# Patient Record
Sex: FEMALE | Race: BLACK OR AFRICAN AMERICAN | URBAN - METROPOLITAN AREA
[De-identification: names, ages, dates, MRNs, and addresses within clinical notes are randomized per-mention and may not be internally consistent; named-entity substitution may affect disease eponyms.]

---

## 2020-07-24 ENCOUNTER — OFFICE VISIT (OUTPATIENT)
Dept: OTOLARYNGOLOGY | Facility: CLINIC | Age: 50
End: 2020-07-24
Payer: COMMERCIAL

## 2020-07-24 ENCOUNTER — TELEPHONE (OUTPATIENT)
Dept: OTOLARYNGOLOGY | Facility: CLINIC | Age: 50
End: 2020-07-24

## 2020-07-24 VITALS
WEIGHT: 221 LBS | DIASTOLIC BLOOD PRESSURE: 82 MMHG | SYSTOLIC BLOOD PRESSURE: 122 MMHG | BODY MASS INDEX: 36.82 KG/M2 | HEIGHT: 65 IN

## 2020-07-24 DIAGNOSIS — E21.3 HYPERPARATHYROIDISM (HCC): ICD-10-CM

## 2020-07-24 DIAGNOSIS — D35.1 PARATHYROID ADENOMA: Primary | ICD-10-CM

## 2020-07-24 PROCEDURE — 99242 OFF/OP CONSLTJ NEW/EST SF 20: CPT | Performed by: OTOLARYNGOLOGY

## 2020-07-24 PROCEDURE — 3074F SYST BP LT 130 MM HG: CPT | Performed by: OTOLARYNGOLOGY

## 2020-07-24 PROCEDURE — 3079F DIAST BP 80-89 MM HG: CPT | Performed by: OTOLARYNGOLOGY

## 2020-07-24 PROCEDURE — 3008F BODY MASS INDEX DOCD: CPT | Performed by: OTOLARYNGOLOGY

## 2020-07-24 RX ORDER — IBUPROFEN 800 MG/1
TABLET ORAL
COMMUNITY

## 2020-07-24 RX ORDER — CHLORTHALIDONE 25 MG/1
25 TABLET ORAL DAILY
COMMUNITY

## 2020-07-24 RX ORDER — DOXYCYCLINE HYCLATE 100 MG/1
CAPSULE ORAL
COMMUNITY

## 2020-07-24 RX ORDER — VALACYCLOVIR HYDROCHLORIDE 500 MG/1
TABLET, FILM COATED ORAL
COMMUNITY

## 2020-07-24 NOTE — TELEPHONE ENCOUNTER
[address] [place] [address], [phone], to obtain a copy of the pt's most recent blood work, per  pt does not have any blood work on file.  Called pt to verify if any recent blood work has been done, per pt she is to have blood work done today, advi

## 2020-07-27 NOTE — TELEPHONE ENCOUNTER
Pt calling to check status of results of labs states was faxed over by reception rep at [address] please advise

## 2020-07-27 NOTE — TELEPHONE ENCOUNTER
Advised pt that no results received, pt states will locate lab work and bring [address] results to [facility] on Friday

## 2020-07-30 ENCOUNTER — TELEPHONE (OUTPATIENT)
Dept: OTOLARYNGOLOGY | Facility: CLINIC | Age: 50
End: 2020-07-30

## 2020-08-03 ENCOUNTER — TELEPHONE (OUTPATIENT)
Dept: OTOLARYNGOLOGY | Facility: CLINIC | Age: 50
End: 2020-08-03

## 2020-08-04 NOTE — TELEPHONE ENCOUNTER
Pt calling again, made aware that results were placed on Dr. Alesha Ralph desk for review, asking for call back today. Please call thank you.

## 2020-08-04 NOTE — TELEPHONE ENCOUNTER
InformeD patient per Dr Radha Hughes PTH level were abnormal and adviseD pt need to have CMP test as Dr Radha Hughes need to know pt Calcium level and need to schedule surgery,pt verbalized understanding.

## 2020-08-06 NOTE — TELEPHONE ENCOUNTER
Sixto green will be seeing  as he is within her network and she has known him for over 20 years. Patient would like to say thank you for everything .

## 2020-08-06 NOTE — TELEPHONE ENCOUNTER
Patient states she is going to have the surgery with Dr. Maddison Mireles, but wants to speak to rn.  Please call

## 2020-08-06 NOTE — TELEPHONE ENCOUNTER
Pt states she found another physician to perform the surgery and wanted to inform MD of the change.  States because of insurance purposes please advise

## 2020-08-11 ENCOUNTER — OFFICE VISIT (OUTPATIENT)
Dept: SURGERY | Facility: CLINIC | Age: 50
End: 2020-08-11
Payer: COMMERCIAL

## 2020-08-11 VITALS — HEIGHT: 65 IN | WEIGHT: 221 LBS | BODY MASS INDEX: 36.82 KG/M2

## 2020-08-11 DIAGNOSIS — E21.3 HYPERPARATHYROIDISM (HCC): Primary | ICD-10-CM

## 2020-08-11 PROCEDURE — 99204 OFFICE O/P NEW MOD 45 MIN: CPT | Performed by: SURGERY

## 2020-08-11 PROCEDURE — 3008F BODY MASS INDEX DOCD: CPT | Performed by: SURGERY

## 2020-08-11 NOTE — H&P
History and Physical      HPI   Patient presents with:  Referral: Referral from Dr. Dr. Anne Poster  for abnormal thyroid. Patient denies any pain.         HPI  Marcos Ness is a 52year old female who presents with primary hyperparathyroidism secondary to a lesions are noted  Neck/Thyroid: neck is supple without adenopathy, no palpable masses.   Respiratory: normal to inspection lungs are clear to auscultation bilaterally normal respiratory effort  Cardiovascular: regular rate and rhythm no murmurs, gallups, o

## 2020-08-11 NOTE — PATIENT INSTRUCTIONS
Obtain preoperative testing. Plan parathyroidectomy. Same-day surgery will call with instructions. Applied

## 2020-08-12 ENCOUNTER — TELEPHONE (OUTPATIENT)
Dept: SURGERY | Facility: CLINIC | Age: 50
End: 2020-08-12

## 2020-08-13 ENCOUNTER — TELEPHONE (OUTPATIENT)
Dept: SURGERY | Facility: CLINIC | Age: 50
End: 2020-08-13

## 2020-08-13 NOTE — TELEPHONE ENCOUNTER
Per Neela Guy the parathyroidectomy (49818) is does not qualify for a 1 night inpatient stay. If there is more information that can be provided due constitute inpatient requirement please fax.  No comorbidity on request. Neela Guy is asking if an outpatient proce

## 2020-08-13 NOTE — TELEPHONE ENCOUNTER
Per Joan Mchugh, case # [id number] pending. Clinical submitted and faxed to [phone] on 8/13/2020.  Called and left message at [facility], [place], operated by [facility] @ [phone]

## 2020-08-13 NOTE — TELEPHONE ENCOUNTER
Approved [phone]. Approval is for outpatient, however, should she need a longer stay, then clinicals will need to be faxed. Detailed message left on VM at [facility] surgery scheduling with a call back number if they need further information.

## 2020-08-17 ENCOUNTER — TELEPHONE (OUTPATIENT)
Dept: SURGERY | Facility: CLINIC | Age: 50
End: 2020-08-17

## 2020-08-19 ENCOUNTER — OFFICE VISIT (OUTPATIENT)
Dept: SURGERY | Facility: CLINIC | Age: 50
End: 2020-08-19
Payer: COMMERCIAL

## 2020-08-19 VITALS — BODY MASS INDEX: 36.82 KG/M2 | WEIGHT: 221 LBS | HEIGHT: 65 IN

## 2020-08-19 DIAGNOSIS — E89.2 STATUS POST PARATHYROIDECTOMY (HCC): Primary | ICD-10-CM

## 2020-08-19 PROCEDURE — 3008F BODY MASS INDEX DOCD: CPT | Performed by: SURGERY

## 2020-08-19 PROCEDURE — 99024 POSTOP FOLLOW-UP VISIT: CPT | Performed by: SURGERY

## 2020-08-19 NOTE — PROGRESS NOTES
Postoperative Patient Follow-up      8/19/2020    HPI  Patient presents with:  Post-Op: Patient here for follow up Parathyroidectomy on 8-. Patient reports recovery going well. Incision healing well.   Patient reports eating and drinking normal.  P

## 2020-08-19 NOTE — PATIENT INSTRUCTIONS
Obtain BMP today. Follow-up with endocrinology in 2 weeks.   Continue taking Tums 2 pills every 8 hours until notified to stop

## 2020-08-20 ENCOUNTER — LAB ENCOUNTER (OUTPATIENT)
Dept: LAB | Age: 50
End: 2020-08-20
Attending: SURGERY
Payer: COMMERCIAL

## 2020-08-20 DIAGNOSIS — E89.2 STATUS POST PARATHYROIDECTOMY (HCC): ICD-10-CM

## 2020-08-20 LAB
ANION GAP SERPL CALC-SCNC: 5 MMOL/L (ref 0–18)
BUN BLD-MCNC: 9 MG/DL (ref 7–18)
BUN/CREAT SERPL: 13.2 (ref 10–20)
CALCIUM BLD-MCNC: 9.3 MG/DL (ref 8.5–10.1)
CHLORIDE SERPL-SCNC: 108 MMOL/L (ref 98–112)
CO2 SERPL-SCNC: 29 MMOL/L (ref 21–32)
CREAT BLD-MCNC: 0.68 MG/DL (ref 0.55–1.02)
GLUCOSE BLD-MCNC: 95 MG/DL (ref 70–99)
OSMOLALITY SERPL CALC.SUM OF ELEC: 292 MOSM/KG (ref 275–295)
PATIENT FASTING Y/N/NP: YES
POTASSIUM SERPL-SCNC: 3.3 MMOL/L (ref 3.5–5.1)
SODIUM SERPL-SCNC: 142 MMOL/L (ref 136–145)

## 2020-08-20 PROCEDURE — 80048 BASIC METABOLIC PNL TOTAL CA: CPT

## 2020-08-20 PROCEDURE — 36415 COLL VENOUS BLD VENIPUNCTURE: CPT

## 2020-09-15 ENCOUNTER — OFFICE VISIT (OUTPATIENT)
Dept: SURGERY | Facility: CLINIC | Age: 50
End: 2020-09-15
Payer: COMMERCIAL

## 2020-09-15 DIAGNOSIS — Z98.890 POST-OPERATIVE STATE: Primary | ICD-10-CM

## 2020-09-15 PROCEDURE — 99024 POSTOP FOLLOW-UP VISIT: CPT | Performed by: SURGERY

## 2020-09-15 NOTE — PROGRESS NOTES
General surgery progress note    Patient is here for a return to work slip with a specific date. She will return tomorrow Wednesday, September 16. She is status post parathyroidectomy doing well without problems.   She will follow-up with her endocrinolog

## 2020-12-21 ENCOUNTER — TELEPHONE (OUTPATIENT)
Dept: OTOLARYNGOLOGY | Facility: CLINIC | Age: 50
End: 2020-12-21

## 2020-12-21 NOTE — TELEPHONE ENCOUNTER
Dr Melchor Sow  patient stated she is seeing Dr Kelsie Hopper for her hyperparathyroidism ,since after the surgery in August of 2020 at [facility] advise.

## 2020-12-21 NOTE — TELEPHONE ENCOUNTER
Cherylene Moulder, MD  [facility]             Patient with hyperparathyroidism. Please contact patient to set up an appointment to be seen in the office. CHeck to see if she has had an ultrasound of the thyroid or Sestamibi nuclear scan.  If yes p

## (undated) NOTE — LETTER
To Whom It May Concern:    Robinson Middleton has been under our care regarding ongoing medical issues. Because of this, she has been required to restrict her physical activities.     She may resume her usual activities, including work, on Wednesday, September

## (undated) NOTE — LETTER
[address], [address] [place] [address]       07/31/20        Patient: Hortencia Prasad   YOB: 1970   Date of Visit: 7/24/2020       Dear  Dr. Leann Gan,      Thank you for referring Hortencia Prasad to my pr